# Patient Record
(demographics unavailable — no encounter records)

---

## 2025-02-28 NOTE — PROCEDURE
[FreeTextEntry1] : PFTs 6/22/21 - essentially normal though with mildly reduced lung volumes and mildly reduced DLCO which corrected for alveolar volume.  [place] 4/1/24 - mild restriction.

## 2025-02-28 NOTE — ASSESSMENT
[FreeTextEntry1] : The EKG illustrates sinus rhythm with baseline artifact, rate of 92 bpm, leftward axis, PRWP across precordial leads.    Is summary, 62-year-old woman with a history for abnormal EKG and heart murmur, and obesity-- in the past who presents back to the office today for general cardiac checkup;      Overall patient reports she has been generally feeling well and denies any significant symptoms for chest pain,  palpitations or dizziness; she gets occasional exertional dyspnea and blames it on her weight.  During last office visit back in November 2023, patient had reported her at-home blood pressures were elevated despite taking Amlodipine 10 mg daily.  We subsequently prescribed Valsartan 160 mg daily in addition to her CCB.  However, patient never filled this script and has only been taking her Amlodpine daily.  She admits her blood pressures have been much better now that she's retired, and she blames her prior labile BP on stress from her old job.    There is a significant family history for heart disease for her mother who developed coronary disease and heart failure in her 70s and 1 brother who apparently had a recent stent who is 1 year older than she;  She does have longtime history for DVT and chronic venous insufficiency, for which she follows Vascular Surgery and recently had yet another vein stripping surgery which apparently helped reduce symptoms by quite a lot.    Echocardiogram (10/30/2023):  Demonstrated preserved left ventricular systolic function with normal ejection fraction 60 to 65%.  There is now mild LVH with worsening aortic root / ascending aorta dilation measuring (4.40 cm) and (4.10 cm) respectively. There was no pericardial effusion with mild to moderate pulmonary insufficiency.  The aortic valve is trileaflet without stenosis and only trace AR.   Exercise Gated Nuclear Stress Test (10/23/2023):  Normal myocardial perfusion scan, with no evidence of infarction or inducible ischemia.  The LV is normal in function and normal in size; LVEF of 73%. There were no ischemic ST changes on stress EKG; ie- negative study.   PLAN:  1).  Continue current cardiac medical regimen. Blood pressures now well controlled with patient not taking Valsartan.  Continue CCB only for now regarding BP control.   Patient is to complete a Transthoracic Echocardiogram prior to follow up to assess overall cardiac function and structure as well as to assess extent of her valvulopathy.  Patient instructed to continue to monitor BP at home and bring log to f/u.  Contact our office within 2-3 weeks if averaging >140/90.   2). Diet and lifestyle modification discussed including low sodium, low fat and low carbohydrate weight reducing diet.  Patient is to implement aerobic exercise regimen few days per week.   3).  Follow up with PCP (Dr. Bay) regarding routine checkups and fasting blood work including lipid panel.  Forward all testing/lab work to our office.   4).  Recommend patient report any untoward symptoms.   5).  If blood pressures remain optimized, can follow up with Dr. Michael in 6 months or PRN.

## 2025-02-28 NOTE — REVIEW OF SYSTEMS
[Sinus Problems] : sinus problems [Seasonal Allergies] : seasonal allergies [Diabetes] : diabetes [Obesity] : obesity [Negative] : Psychiatric

## 2025-02-28 NOTE — CONSULT LETTER
[Dear  ___] : Dear  [unfilled], [Consult Letter:] : I had the pleasure of evaluating your patient, [unfilled]. [Please see my note below.] : Please see my note below. [Consult Closing:] : Thank you very much for allowing me to participate in the care of this patient.  If you have any questions, please do not hesitate to contact me. [Sincerely,] : Sincerely, [FreeTextEntry3] : Harshad Cardozo MD, FCCP, D. ABSM Pulmonary and Sleep Medicine [facility] at [place]

## 2025-02-28 NOTE — PHYSICAL EXAM
[Well Developed] : well developed [No Acute Distress] : no acute distress [Obese] : obese [Normal Conjunctiva] : normal conjunctiva [Normal Venous Pressure] : normal venous pressure [No Carotid Bruit] : no carotid bruit [Normal S1, S2] : normal S1, S2 [No Murmur] : no murmur [No Rub] : no rub [No Gallop] : no gallop [Clear Lung Fields] : clear lung fields [Good Air Entry] : good air entry [No Respiratory Distress] : no respiratory distress  [Soft] : abdomen soft [Non Tender] : non-tender [No Masses/organomegaly] : no masses/organomegaly [Normal Gait] : normal gait [No Cyanosis] : no cyanosis [No Clubbing] : no clubbing [No Rash] : no rash [No Skin Lesions] : no skin lesions [Moves all extremities] : moves all extremities [No Focal Deficits] : no focal deficits [Normal Speech] : normal speech [Alert and Oriented] : alert and oriented [Normal memory] : normal memory [de-identified] : trace edema of the left lower extremity noted with some chronic stasis changes and varicosities;

## 2025-02-28 NOTE — DISCUSSION/SUMMARY
[FreeTextEntry1] :  #1. Prior PFTs were essentially normal though with mildly reduced lung volumes and mildly reduced DLCO which corrected for alveolar volume. Subsequent alex with mild restriction. #2. The patient does not appear to require chronic BD therapy at this time though has prn Albuterol for possible intermittent asthma. #3. Diet and exercise for weight loss. #4. SOBOE is likely at least somewhat related to weight or deconditioning. #5. CXR to evaluate SOBOE. #6. Home PSG to evaluate for possible TIFFANIE. #7. Consider PAP therapy for significant TIFFANIE. #8. S/p Covid vaccines and boosters. #9. Never smoker. #10. F/u in 8 weeks with HST, alex, and CXR.   The patient expressed understanding and agreement with the above recommendations/plan and accepts responsibility to be compliant with recommended testing, therapies, and f/u visits. All relevant questions and concerns were addressed.

## 2025-02-28 NOTE — REASON FOR VISIT
[Initial] : an initial visit [Asthma] : asthma [Sleep Apnea] : sleep apnea [Shortness of Breath] : shortness of breath [Obesity] : obesity [TextBox_44] : Prior Covid infections

## 2025-02-28 NOTE — REASON FOR VISIT
[FreeTextEntry3] : VICTOR HUGO Bay [FreeTextEntry1] : The patient is a 62-year-old woman with a history for abnormal EKG and heart murmur, and obesity-- in the past who presents back to the office today for general cardiac checkup;     Overall patient reports she has been generally feeling well and denies any significant symptoms for chest pain, palpitations or dizziness; she gets occasional exertional dyspnea and blames it on her weight.  During last office visit back in November 2023, patient had reported her at-home blood pressures were elevated despite taking Amlodipine 10 mg daily.  We subsequently prescribed Valsartan 160 mg daily in addition to her CCB.  However, patient never filled this script and has only been taking her Amlodpine daily.  She admits her blood pressures have been much better now that she's retired, and she blames her prior labile BP on stress from her old job.    There is a significant family history for heart disease for her mother who developed coronary disease and heart failure in her 70s and 1 brother who apparently had a recent stent who is 1 year older than she;

## 2025-02-28 NOTE — HISTORY OF PRESENT ILLNESS
[Snoring] : snoring [Initial Evaluation] : an initial evaluation of [Excess Weight] : excess weight [Currently Experiencing] : The patient is currently experiencing symptoms. [Low Calorie Diet] : low calorie diet [Fair Compliance] : fair compliance with treatment [Fair Tolerance] : fair tolerance of treatment [Poor Symptom Control] : poor symptom control [Dyslipidemia] : dyslipidemia [Diabetes] : diabetes [Hypertension] : hypertension [High] : high [Low Calorie] : low calorie [Well Balanced Diet] : well balanced meals [None] : The patient does not exercise [TextBox_4] : Never smoker. S/p Covid infections. Patient c/o SOBOE but is otherwise without associated respiratory complaints.  H/o DVT per pt but did not require AC therapy. [Excessive Daytime Sleepiness] : no excessive daytime sleepiness [Unrefreshing Sleep] : no unrefreshing sleep [Sleepy When Sedentary] : no sleepy when sedentary [ESS] : 3

## 2025-02-28 NOTE — REVIEW OF SYSTEMS
[Dyspnea on exertion] : dyspnea during exertion [Negative] : Heme/Lymph [Weight Gain (___ Lbs)] : [unfilled] ~Ulb weight gain

## 2025-02-28 NOTE — HISTORY OF PRESENT ILLNESS
[FreeTextEntry1] : She does have longtime history for DVT and chronic venous insufficiency, for which she follows Vascular Surgery and recently had yet another vein stripping surgery which apparently helped reduce symptoms by quite a lot.    Echocardiogram (10/30/2023):  Demonstrated preserved left ventricular systolic function with normal ejection fraction 60 to 65%.  There is now mild LVH with worsening aortic root / ascending aorta dilation measuring (4.40 cm) and (4.10 cm) respectively. There was no pericardial effusion with mild to moderate pulmonary insufficiency.  The aortic valve is trileaflet without stenosis and only trace AR.   Exercise Gated Nuclear Stress Test (10/23/2023):  Normal myocardial perfusion scan, with no evidence of infarction or inducible ischemia.  The LV is normal in function and normal in size; LVEF of 73%. There were no ischemic ST changes on stress EKG; ie- negative study.

## 2025-02-28 NOTE — PHYSICAL EXAM
[No Acute Distress] : no acute distress [Low Lying Soft Palate] : low lying soft palate [Elongated Uvula] : elongated uvula [Enlarged Base of the Tongue] : enlarged base of the tongue [III] : Mallampati Class: III [Normal Appearance] : normal appearance [Supple] : supple [Normal Rate/Rhythm] : normal rate/rhythm [Normal S1, S2] : normal s1, s2 [No Murmurs] : no murmurs [No Resp Distress] : no resp distress [No Acc Muscle Use] : no acc muscle use [Normal Rhythm and Effort] : normal rhythm and effort [Clear to Auscultation Bilaterally] : clear to auscultation bilaterally [No Abnormalities] : no abnormalities [Benign] : benign [Not Tender] : not tender [Soft] : soft [No Clubbing] : no clubbing [No Edema] : no edema [No Focal Deficits] : no focal deficits [Oriented x3] : oriented x3 [TextBox_11] : moderate to severe oropharyngeal crowding.

## 2025-02-28 NOTE — END OF VISIT
[Time Spent: ___ minutes] : I have spent [unfilled] minutes of time on the encounter which excludes teaching and separately reported services. [TextEntry] : Discussed with pt at length regarding TIFFANIE, obesity, soboe, asthma, prior Covid infection; reviewed w/u with pt as above.

## 2025-03-25 NOTE — ASSESSMENT
[FreeTextEntry1] : Type 2 DM Morbid obesity    Does not have a gall bladder   Counseled on risk for MTC, pancreatitis, worsening gall bladder stones and worsening DR Medication to stop 2 weeks before planned surgery.   We agreed on the following plan today. Continue Metformin 500 daily  Start Mounjaro 2.5 for 4 weeks will increase to 5 mg  Check sugars  times a day. Low carb diet and exercise Please see an eye doctor Please see a foot doctor if you have tingling or numbness Please see our CDE     HLD On statin  Check LDL   HTN Acceptable  On Amlodipine   Morbid Obesity Low carb diet and exercise    I spent 60  minutes discussing with patient face to face and non face to face reviewing documentations, labs, and/or imaging, also discussing the management plans.   RTC in 3-4 months with NP and 6 months with me

## 2025-03-25 NOTE — HISTORY OF PRESENT ILLNESS
[FreeTextEntry1] : Here for type 2 DM  History of DM: Diagnosed in 2024 Severity: controlled  Home regimen: Metformin 500 daily     Previous medications None    Sugar checks: none  Hypoglycemia: none  Eye doctor: yearly.  no Dr  Neuropathy: None  Kidney disease: none    Smoking: none  Exercise: sometimes    Labs: A1c 6.5   All other ROS reviewed, and they are negative.   Labs reviewed.

## 2025-05-13 NOTE — REASON FOR VISIT
[Follow-Up] : a follow-up visit [Asthma] : asthma [Sleep Apnea] : sleep apnea [Shortness of Breath] : shortness of breath [Obesity] : obesity [TextBox_44] : Prior Covid infections

## 2025-05-13 NOTE — RESULTS/DATA
[TextEntry] : CXR from 3/5/25 with clear lungs but prominence of pulmonary trunk/main pulm artery. CXR from 3/13/25 was clear. Home PSG from 3/21/25 revealed moderate TIFFANIE with an AHI of 17.8.

## 2025-05-13 NOTE — DISCUSSION/SUMMARY
[FreeTextEntry1] :  #1. Prior PFTs were essentially normal though with mildly reduced lung volumes and mildly reduced DLCO which corrected for alveolar volume. Subsequent alex with mild restriction x2, possibly related to weight. #2. The patient does not appear to require chronic BD therapy at this time though has prn Albuterol for possible intermittent asthma. #3. Diet and exercise for weight loss. #4. SOBOE is likely at least somewhat related to weight or deconditioning. #5. CXR to evaluate SOBOE initially with prominence in the area of the pulm trunk but subsequent CXR was unremarkable. Pt to f/u with cardio. #6. Start autoCPAP to treat moderate TIFFANIE with an AHI of 17.8; encouraged at least 70% compliance. #7. Replace PAP equipment as needed; ordered machine and supplies 5/13/25. #8. S/p Covid vaccines and boosters. #9. Never smoker. #10. F/u in 8 weeks with compliance.   The patient expressed understanding and agreement with the above recommendations/plan and accepts responsibility to be compliant with recommended testing, therapies, and f/u visits. All relevant questions and concerns were addressed.

## 2025-05-13 NOTE — HISTORY OF PRESENT ILLNESS
[Snoring] : snoring [Follow-Up - Routine Clinic] : a routine clinic follow-up of [Excess Weight] : excess weight [Currently Experiencing] : The patient is currently experiencing symptoms. [Low Calorie Diet] : low calorie diet [Fair Compliance] : fair compliance with treatment [Fair Tolerance] : fair tolerance of treatment [Poor Symptom Control] : poor symptom control [Dyslipidemia] : dyslipidemia [Diabetes] : diabetes [Hypertension] : hypertension [High] : high [Low Calorie] : low calorie [Well Balanced Diet] : well balanced meals [None] : The patient does not exercise [TextBox_4] : Never smoker. S/p Covid infections. Patient c/o SOBOE but is otherwise without associated respiratory complaints.  H/o DVT per pt but did not require AC therapy. Pt reports AM cough but none throughout the day. She admits to allergies and PNDS. [ESS] : 3 [Excessive Daytime Sleepiness] : no excessive daytime sleepiness [Unrefreshing Sleep] : no unrefreshing sleep [Sleepy When Sedentary] : no sleepy when sedentary

## 2025-05-13 NOTE — PROCEDURE
[FreeTextEntry1] : PFTs 6/22/21 - essentially normal though with mildly reduced lung volumes and mildly reduced DLCO which corrected for alveolar volume.  [place] 4/1/24 - mild restriction. [place] 5/13/25 - mild, borderline restriction.

## 2025-05-13 NOTE — CONSULT LETTER
[Dear  ___] : Dear  [unfilled], [Consult Letter:] : I had the pleasure of evaluating your patient, [unfilled]. [Please see my note below.] : Please see my note below. [Consult Closing:] : Thank you very much for allowing me to participate in the care of this patient.  If you have any questions, please do not hesitate to contact me. [Sincerely,] : Sincerely, [FreeTextEntry3] : Harshad Cardozo MD, FCCP, D. ABSM Pulmonary and Sleep Medicine [facility] Pul[facility] at [place]

## 2025-06-10 NOTE — ASSESSMENT
[FreeTextEntry1] : T2DM- controlled, A1C 6.8% - Mounjaro helping with appetite suppression, down 7 lbs since last visit - Check BG at least 1x per day- will send for glucomter and testing supplies - Educated on diet and exercise, goal exercise 5x per week for 30 min each day - Needs to see ophthalmology especially while on Mounjaro, referral given - Continue  mg daily - Increase Mounjaro to 7.5 mg weekly for further weight loss benefits   Obesity - Diet, exercise and lifestyle changes - Mounjaro above   HTN- BP acceptable - C/w current regimen   I have counseled the patient on the benefits, risks and side effects of Mounjaro. We discussed the benefits from cardiac standpoint and on glucose and weight reduction. We also discussed side effects with the patient including possible nausea, vomiting or other GI side effects. I have also discussed the risk of pancreatitis, including the symptoms to look out for, such as food intolerance, nausea/emesis and abdominal pain. The patient was also explained the link with medullary thyroid cancer and has denied any personal or family history of medullary thyroid cancer. I also counseled patient on the gallbladder inflammation risks as well as the risk for worsening gallbladder stones (patient no longer has gallbladder). As well as the risk of worsening retinopathy and the need to follow up with an eye doctor. Patient also instructed to hold the medication 1-2 weeks prior to having surgery. Patient currently denies any abdominal pain. Patient able to verbalize and teach back understanding of risks vs benefits.   Patient leaving for [place] August-October (offered CDE appointment as check-in before she leaves but pt declines) Labs to be done before she leaves, then reschedule appointment for when she returns with Dr Medina

## 2025-06-10 NOTE — PHYSICAL EXAM
[Alert] : alert [Obese] : obese [No Accessory Muscle Use] : no accessory muscle use [Normal Rate and Effort] : normal respiratory rate and effort [Clear to Auscultation] : lungs were clear to auscultation bilaterally [Normal S1, S2] : normal S1 and S2 [Oriented x3] : oriented to person, place, and time [Normal Affect] : the affect was normal [Normal Mood] : the mood was normal

## 2025-06-10 NOTE — HISTORY OF PRESENT ILLNESS
[FreeTextEntry1] : Interval hx: No changes in health, no complaints. Down 7 lbs since last visit Leaves August 19th returns October from [place] to stay with family- will have to reschedule September appointment with Dr Medina  History of DM: Diagnosed in 2024 Severity: controlled Home regimen: Metformin 500 daily Mounjaro 5 mg weekly (Tuesday)  Previous medications None  FS in office: 91  Sugar checks: none Hypoglycemia: denies Eye doctor: has not been for a dilated eye exam, referral given Neuropathy: None Kidney disease: none  Smoking: none Exercise: walking/treadmill once a week  Labs April 2025 A1c 6.8% Cr 0.74, GFR 91, -COLBY , TG 93, HDL 49, LDL 67 TSH 1.99   All other ROS reviewed, and they are negative. Labs reviewed.

## 2025-07-29 NOTE — PROCEDURE
[FreeTextEntry1] : PFTs 6/22/21 - essentially normal though with mildly reduced lung volumes and mildly reduced DLCO which corrected for alveolar volume.  [place] 4/1/24 - mild restriction. [place] 5/13/25 - mild, borderline restriction; improved slightly from prior.

## 2025-07-29 NOTE — RESULTS/DATA
[TextEntry] : CXR from 3/5/25 with clear lungs but prominence of pulmonary trunk/main pulm artery. CXR from 3/13/25 was clear. Home PSG from 3/21/25 revealed moderate TIFFANIE with an AHI of 17.8. The patient's overall compliance is 73% with a >4hr compliance of 8.8% on autoCPAP with a median and max pressure of 5.8 and 8.8 cm of water, respectively with a residual AHI of 1.3 which is normal.

## 2025-07-29 NOTE — CONSULT LETTER
[Dear  ___] : Dear  [unfilled], [Consult Letter:] : I had the pleasure of evaluating your patient, [unfilled]. [Please see my note below.] : Please see my note below. [Consult Closing:] : Thank you very much for allowing me to participate in the care of this patient.  If you have any questions, please do not hesitate to contact me. [Sincerely,] : Sincerely, [FreeTextEntry3] : Harshad Cardozo MD, FCCP, D. ABSM Pulmonary and Sleep Medicine [facility] at [place]

## 2025-07-29 NOTE — DISCUSSION/SUMMARY
Addended by: JUDITH SOTO on: 6/20/2024 01:37 PM     Modules accepted: Orders     [FreeTextEntry1] :  #1. Prior PFTs were essentially normal though with mildly reduced lung volumes and mildly reduced DLCO which corrected for alveolar volume. Subsequent alex with mild restriction x2, possibly related to weight. #2. The patient does not appear to require chronic BD therapy at this time though has prn Albuterol for possible intermittent asthma. #3. Diet and exercise for weight loss. #4. SOBOE is likely at least somewhat related to weight or deconditioning. #5. CXR to evaluate SOBOE initially with prominence in the area of the pulm trunk but subsequent CXR was unremarkable. Pt to f/u with cardio. #6. Start autoCPAP to treat moderate TIFFANIE with an AHI of 17.8; encouraged at least 70% compliance as pt is not compliant yet. #7. Replace PAP equipment as needed; ordered machine and supplies 5/13/25.  #8. S/p Covid vaccines and boosters. #9. Never smoker. #10. F/u in 6-8 weeks with compliance.   The patient expressed understanding and agreement with the above recommendations/plan and accepts responsibility to be compliant with recommended testing, therapies, and f/u visits. All relevant questions and concerns were addressed.

## 2025-07-29 NOTE — HISTORY OF PRESENT ILLNESS
[Follow-Up - Routine Clinic] : a routine clinic follow-up of [Excess Weight] : excess weight [Currently Experiencing] : The patient is currently experiencing symptoms. [Low Calorie Diet] : low calorie diet [Poor Symptom Control] : poor symptom control [Dyslipidemia] : dyslipidemia [Diabetes] : diabetes [Hypertension] : hypertension [High] : high [Low Calorie] : low calorie [Well Balanced Diet] : well balanced meals [TextBox_4] : Never smoker. S/p Covid infections. Patient c/o SOBOE but is otherwise without associated respiratory complaints.  H/o DVT per pt but did not require AC therapy. Pt reports AM cough but none throughout the day. She admits to allergies and PNDS. [ESS] : 3 [None] : No associated symptoms are reported [CPAP] : CPAP [Fair Compliance] : fair compliance with treatment [Fair Tolerance] : fair tolerance of treatment [Fair Symptom Control] : fair symptom control